# Patient Record
Sex: MALE | URBAN - METROPOLITAN AREA
[De-identification: names, ages, dates, MRNs, and addresses within clinical notes are randomized per-mention and may not be internally consistent; named-entity substitution may affect disease eponyms.]

---

## 2018-07-02 ENCOUNTER — IMPORTED ENCOUNTER (OUTPATIENT)
Dept: URBAN - METROPOLITAN AREA CLINIC 1 | Facility: CLINIC | Age: 28
End: 2018-07-02

## 2018-07-02 PROBLEM — H10.022: Noted: 2018-07-02

## 2018-07-02 PROCEDURE — 92004 COMPRE OPH EXAM NEW PT 1/>: CPT

## 2018-07-02 NOTE — PATIENT DISCUSSION
1.  Bacterial Conjunctivitis OS -- no FB present on today's exam. Begin Zylet QID OS (sample given and erx no refills). Begin ATs TID-QID OS routinely. The condition was discussed with the patient. Topical antibiotic drops were prescribed. The mechanism of transmission was explained. The patient was advised to wash his hands and use separate towels and bedding. Return for an appointment in [place] with Dr. Lula Andersen.

## 2022-04-02 ASSESSMENT — VISUAL ACUITY
OS_CC: 20/20
OD_CC: 20/20

## 2022-04-02 ASSESSMENT — TONOMETRY
OD_IOP_MMHG: 16
OS_IOP_MMHG: 15